# Patient Record
Sex: FEMALE | Race: WHITE
[De-identification: names, ages, dates, MRNs, and addresses within clinical notes are randomized per-mention and may not be internally consistent; named-entity substitution may affect disease eponyms.]

---

## 2022-04-29 ENCOUNTER — HOSPITAL ENCOUNTER (EMERGENCY)
Dept: HOSPITAL 76 - ED | Age: 57
Discharge: HOME | End: 2022-04-29
Payer: COMMERCIAL

## 2022-04-29 VITALS — SYSTOLIC BLOOD PRESSURE: 105 MMHG | DIASTOLIC BLOOD PRESSURE: 77 MMHG

## 2022-04-29 DIAGNOSIS — J03.90: Primary | ICD-10-CM

## 2022-04-29 PROCEDURE — 87070 CULTURE OTHR SPECIMN AEROBIC: CPT

## 2022-04-29 PROCEDURE — 99283 EMERGENCY DEPT VISIT LOW MDM: CPT

## 2022-04-29 PROCEDURE — 87430 STREP A AG IA: CPT

## 2022-04-29 NOTE — ED PHYSICIAN DOCUMENTATION
PD HPI HEENT





- Stated complaint


Stated Complaint: THROAT SWELLING/FEVER/CHILLS





- Chief complaint


Chief Complaint: Heent





- History obtained from


History obtained from: Patient, Family





- History of Present Illness


Timing - onset: Yesterday


Timing - duration: Days (1)


Timing - details: Gradual onset, Still present


Location: Throat


Worsens: Swalllowing


Associated symptoms: Congestion, Headache.  No: Fever, Cough


Similar symptoms before: Diagnosis (ENT infection)


Recently seen: Not recently seen





- Additional information


Additional information: 





56-year-old female with a history of lupus is visiting from [place] and she 

has developed a sore throat.  She has had this happen to her number of times 

previously and she recalls that she is on some Plaquenil and has stage III 

kidney disease.  She is concerned about getting dehydrated as it is painful to 

even to swallow.





Review of Systems


Constitutional: denies: Fever


Eyes: denies: Decreased vision


Ears: denies: Ear pain


Nose: reports: Rhinorrhea / runny nose, Congestion


Throat: reports: Sore throat


Cardiac: denies: Chest pain / pressure, Palpitations


Respiratory: denies: Dyspnea, Cough


GI: denies: Abdominal Pain, Nausea, Vomiting, Constipation, Diarrhea


: denies: Dysuria, Frequency


Skin: denies: Rash


Musculoskeletal: reports: Extremity pain, Joint pain.  denies: Neck pain, Back 

pain





PD PAST MEDICAL HISTORY





- Present Medications


Home Medications: 


                                Ambulatory Orders











 Medication  Instructions  Recorded  Confirmed


 


Amox/Clav 875/125 [Augmentin] 1 each PO Q12H #20 tablet 04/29/22 


 


Atorvastatin [Lipitor] 20 mg PO DAILY 04/29/22 04/29/22


 


Esomeprazole Magnesium [Nexium] 40 mg PO DAILY 04/29/22 04/29/22


 


Ferrous Sulfate 325 mg PO BID 04/29/22 04/29/22


 


Hydroxychloroquine [Plaquenil] 200 mg PO DAILY 04/29/22 04/29/22


 


Pregabalin [Lyrica] 150 mg PO BID 04/29/22 04/29/22


 


Rivaroxaban [Xarelto] 10 mg PO DAILY 04/29/22 04/29/22


 


atenoloL [Tenormin] 25 mg PO BID 04/29/22 04/29/22


 


azaTHIOprine [Imuran] 50 mg PO DAILY 04/29/22 04/29/22


 


calcitrioL [Rocaltrol] 0.25 mcg PO DAILY 04/29/22 04/29/22


 


hydroCHLOROthiazide [Hydrodiuril] 25 mg PO DAILY 04/29/22 04/29/22














- Allergies


Allergies/Adverse Reactions: 


                                    Allergies











Allergy/AdvReac Type Severity Reaction Status Date / Time


 


Milk Containing Products AdvReac  Nausea Verified 04/29/22 09:25














PD ED PE NORMAL





- Vitals


Vital signs reviewed: Yes (Tachycardic mild)





- General


General: Alert and oriented X 3, No acute distress, Well developed/nourished





- HEENT


HEENT: Atraumatic, PERRL, EOMI, Ears normal, Other (The tonsils are 2+ cryptic 

and exudative.)





- Neck


Neck: Supple, no meningeal sign, No bony TTP





- Cardiac


Cardiac: RRR, No murmur





- Respiratory


Respiratory: No respiratory distress, Clear bilaterally





- Abdomen


Abdomen: Normal bowel sounds, Soft, Non tender, Non distended, No organomegaly





- Back


Back: No CVA TTP, No spinal TTP





- Derm


Derm: Normal color, Warm and dry, No rash





- Extremities


Extremities: No deformity, No edema





- Neuro


Neuro: Alert and oriented X 3, CNs 2-12 intact, No motor deficit, No sensory 

deficit, Normal speech


Eye Opening: Spontaneous


Motor: Obeys Commands


Verbal: Oriented


GCS Score: 15





- Psych


Psych: Normal mood, Normal affect





Results





- Vitals


Vitals: 





                               Vital Signs - 24 hr











  04/29/22





  09:19


 


Temperature 36.3 C L


 


Heart Rate 103 H


 


Respiratory 16





Rate 


 


Blood Pressure 105/77


 


O2 Saturation 98








                                     Oxygen











O2 Source                      Room air

















PD MEDICAL DECISION MAKING





- ED course


Complexity details: considered differential, d/w patient, d/w family


ED course: 





56-year-old female on immunosuppression with lupus is developed a sore throat 

with exudative tonsillitis.  She has had this happen to her previously.  She is 

mostly concerned that she will not be able to swallow adequately to stay 

hydrated.  She is given a dose of dexamethasone 10 mg orally and we will place 

her on a course of Augmentin.  The patient is grateful for treatment stating th

at she has had to do this a number of times previously.





Departure





- Departure


Disposition: 01 Home, Self Care


Clinical Impression: 


 Exudative tonsillitis





Condition: Stable


Instructions:  ED Tonsillitis


Follow-Up: 


[facility] [Provider Group]


Prescriptions: 


Amox/Clav 875/125 [Augmentin] 1 each PO Q12H #20 tablet


Comments: 


Amalia, today it looks like you have exudative tonsillitis and this is usually 

an opportunistic infection when your immune system is down.  The recommendation 

is to drink additional fluids get plenty of rest and take the antibiotic as pres

cribed.  [facility] [place]  Today you 

were given a single dose of dexamethasone 10 mg.  This should have you feeling a

 bit better later on today.

## 2022-05-26 ENCOUNTER — HOSPITAL ENCOUNTER (OUTPATIENT)
Dept: HOSPITAL 76 - LAB.N | Age: 57
Discharge: HOME | End: 2022-05-26
Attending: NURSE PRACTITIONER
Payer: COMMERCIAL

## 2022-05-26 ENCOUNTER — HOSPITAL ENCOUNTER (OUTPATIENT)
Dept: HOSPITAL 76 - DI.N | Age: 57
Discharge: HOME | End: 2022-05-26
Attending: NURSE PRACTITIONER
Payer: COMMERCIAL

## 2022-05-26 DIAGNOSIS — L93.0: Primary | ICD-10-CM

## 2022-05-26 DIAGNOSIS — L93.0: ICD-10-CM

## 2022-05-26 DIAGNOSIS — M79.672: Primary | ICD-10-CM

## 2022-05-26 DIAGNOSIS — M10.9: ICD-10-CM

## 2022-05-26 DIAGNOSIS — M85.88: ICD-10-CM

## 2022-05-26 LAB
ALBUMIN DIAFP-MCNC: 3.6 G/DL (ref 3.2–5.5)
ALBUMIN/GLOB SERPL: 1 {RATIO} (ref 1–2.2)
ALP SERPL-CCNC: 85 IU/L (ref 42–121)
ALT SERPL W P-5'-P-CCNC: 22 IU/L (ref 10–60)
ANION GAP SERPL CALCULATED.4IONS-SCNC: 11 MMOL/L (ref 6–13)
AST SERPL W P-5'-P-CCNC: 20 IU/L (ref 10–42)
BASOPHILS NFR BLD AUTO: 0 10^3/UL (ref 0–0.1)
BASOPHILS NFR BLD AUTO: 0.4 %
BILIRUB BLD-MCNC: 0.3 MG/DL (ref 0.2–1)
BUN SERPL-MCNC: 32 MG/DL (ref 6–20)
CALCIUM UR-MCNC: 9 MG/DL (ref 8.5–10.3)
CHLORIDE SERPL-SCNC: 103 MMOL/L (ref 101–111)
CO2 SERPL-SCNC: 27 MMOL/L (ref 21–32)
CREAT SERPLBLD-SCNC: 1.6 MG/DL (ref 0.4–1)
CRP SERPL-MCNC: < 1 MG/DL (ref 0–1)
EOSINOPHIL # BLD AUTO: 0.1 10^3/UL (ref 0–0.7)
EOSINOPHIL NFR BLD AUTO: 0.9 %
ERYTHROCYTE [DISTWIDTH] IN BLOOD BY AUTOMATED COUNT: 14.2 % (ref 12–15)
GFRSERPLBLD MDRD-ARVRAT: 34 ML/MIN/{1.73_M2} (ref 89–?)
GLOBULIN SER-MCNC: 3.7 G/DL (ref 2.1–4.2)
GLUCOSE SERPL-MCNC: 117 MG/DL (ref 70–100)
HCT VFR BLD AUTO: 32.7 % (ref 37–47)
HGB UR QL STRIP: 10.3 G/DL (ref 12–16)
IRON SATN MFR SERPL: 15 % (ref 20–50)
IRON SERPL-MCNC: 54 UG/DL (ref 28–170)
LYMPHOCYTES # SPEC AUTO: 2 10^3/UL (ref 1.5–3.5)
LYMPHOCYTES NFR BLD AUTO: 20.1 %
MCH RBC QN AUTO: 28.4 PG (ref 27–31)
MCHC RBC AUTO-ENTMCNC: 31.5 G/DL (ref 32–36)
MCV RBC AUTO: 90.1 FL (ref 81–99)
MONOCYTES # BLD AUTO: 0.6 10^3/UL (ref 0–1)
MONOCYTES NFR BLD AUTO: 5.5 %
NEUTROPHILS # BLD AUTO: 7.2 10^3/UL (ref 1.5–6.6)
NEUTROPHILS # SNV AUTO: 9.9 X10^3/UL (ref 4.8–10.8)
NEUTROPHILS NFR BLD AUTO: 72.5 %
NRBC # BLD AUTO: 0 /100WBC
NRBC # BLD AUTO: 0 X10^3/UL
PDW BLD AUTO: 12 FL (ref 7.9–10.8)
PLATELET # BLD: 312 10^3/UL (ref 130–450)
POTASSIUM SERPL-SCNC: 4.8 MMOL/L (ref 3.5–5)
PROT SPEC-MCNC: 7.3 G/DL (ref 6.7–8.2)
RBC MAR: 3.63 10^6/UL (ref 4.2–5.4)
SODIUM SERPLBLD-SCNC: 141 MMOL/L (ref 135–145)
TIBC SERPL-MCNC: 364 UG/DL (ref 250–450)
TRANSFERRIN SERPL-MCNC: 260 MG/DL (ref 192–382)
URATE SERPL-MCNC: 8.8 MG/DL (ref 2.6–7.2)

## 2022-05-26 PROCEDURE — 83540 ASSAY OF IRON: CPT

## 2022-05-26 PROCEDURE — 82728 ASSAY OF FERRITIN: CPT

## 2022-05-26 PROCEDURE — 36415 COLL VENOUS BLD VENIPUNCTURE: CPT

## 2022-05-26 PROCEDURE — 84466 ASSAY OF TRANSFERRIN: CPT

## 2022-05-26 PROCEDURE — 85025 COMPLETE CBC W/AUTO DIFF WBC: CPT

## 2022-05-26 PROCEDURE — 84550 ASSAY OF BLOOD/URIC ACID: CPT

## 2022-05-26 PROCEDURE — 80053 COMPREHEN METABOLIC PANEL: CPT

## 2022-05-26 PROCEDURE — 86140 C-REACTIVE PROTEIN: CPT

## 2022-05-26 PROCEDURE — 85651 RBC SED RATE NONAUTOMATED: CPT

## 2022-05-26 NOTE — XRAY REPORT
PROCEDURE:  Foot 3 View LT

 

INDICATIONS:  PAIN IN LEFT FOOT; LUPUS; GOUT, ACUTE

 

TECHNIQUE:  3 views of the foot were acquired.  

 

COMPARISON:  None

 

FINDINGS:  

 

Bones: No acute fractures and normal bone alignment. There is ill-defined noncorticated calcification
 lateral to the midfoot calcaneocuboid articulation, potentially tenderness. Periarticular erosion is
 present along the medial base of the first distal phalanx. No suspicious bony lesions.  

 

Soft tissues:  No tibiotalar joint effusion. Mild swelling of the forefoot. Mild calcification at the
 Achilles tendon insertion.

 

IMPRESSION:  

1. Periarticular erosion at the first distal phalanx base may be secondary to gouty arthritis.

2. Possible tendinous or ligamentous calcification at the lateral midfoot.

3. No other suspicious soft tissue calcifications.

 

Reviewed by: Audrey Hong MD on 5/26/2022 3:26 PM PDT

Approved by: Audrey Hong MD on 5/26/2022 3:26 PM PDT

 

 

Station ID:  [id number]

## 2022-06-28 ENCOUNTER — HOSPITAL ENCOUNTER (OUTPATIENT)
Dept: HOSPITAL 76 - DI.WOS | Age: 57
Discharge: HOME | End: 2022-06-28
Attending: ORTHOPAEDIC SURGERY
Payer: COMMERCIAL

## 2022-06-28 DIAGNOSIS — R93.6: ICD-10-CM

## 2022-06-28 DIAGNOSIS — M89.8X7: Primary | ICD-10-CM

## 2022-06-29 NOTE — XRAY REPORT
PROCEDURE:  Foot 3 View LT

 

INDICATIONS:  FOOT PAIN

 

TECHNIQUE:  3 views of the foot were acquired.  

 

COMPARISON:  Left foot radiographs 5/26/2022.

 

FINDINGS:  

 

Bones: No acute fracture or dislocation visualized. Similar periarticular lucency at the base of the 
first digit distal phalanx. Periarticular lucencies also present at the fifth MTP joint.

 

Soft tissues:  No definite tibiotalar joint effusion. Similar ill-defined calcification adjacent to t
he calcaneocuboid articulation. An achilles tendon enthesophyte is present.  

 

IMPRESSION:  

 

1. No acute osseous abnormality.

2. Similar periarticular lucency at the base of the first digit distal phalanx. Periarticular lucenci
es also present at the fifth MTP joint. Findings could represent sequela of gout/inflammatory arthrop
athy.

 

Reviewed by: Jaosn Dhillon MD on 6/29/2022 9:02 AM PDT

Approved by: Jason Dhillon MD on 6/29/2022 9:02 AM PDT

 

 

Station ID:  [id number]